# Patient Record
Sex: FEMALE | URBAN - METROPOLITAN AREA
[De-identification: names, ages, dates, MRNs, and addresses within clinical notes are randomized per-mention and may not be internally consistent; named-entity substitution may affect disease eponyms.]

---

## 2020-09-17 ENCOUNTER — APPOINTMENT (RX ONLY)
Dept: URBAN - METROPOLITAN AREA CLINIC 115 | Facility: CLINIC | Age: 25
Setting detail: DERMATOLOGY
End: 2020-09-17

## 2020-09-17 DIAGNOSIS — Z41.9 ENCOUNTER FOR PROCEDURE FOR PURPOSES OTHER THAN REMEDYING HEALTH STATE, UNSPECIFIED: ICD-10-CM

## 2020-09-17 PROCEDURE — ? BOTOX

## 2020-09-17 PROCEDURE — ? RESTYLANE DEFYNE INJECTION

## 2020-09-17 NOTE — PROCEDURE: RESTYLANE DEFYNE INJECTION
Additional Anesthesia Volume In Cc: 6
Expiration Date (Month Year): 01/31/2022
Dorsal Hands Filler  Volume In Cc: 0
Use Map Statement For Sites (Optional): No
Anesthesia Type: 1% lidocaine with epinephrine
Post-Care Instructions: Patient instructed to apply ice to reduce swelling.
Additional Area 1 Location: chin
Number Of Syringes (Required For Inventory): 1
Procedural Text: The filler was administered to the treatment areas noted above.
Additional Area 1 Volume In Cc: 0.8
Map Statement: See Attach Map for Complete Details
Anesthesia Volume In Cc: 0.5
Detail Level: Detailed
Filler: Restylane Defyne
Lot #: [id number]
Consent: Written consent obtained. Risks include but not limited to bruising, beading, irregular texture, ulceration, infection, allergic reaction, scar formation, incomplete augmentation, temporary nature, procedural pain.

## 2020-09-17 NOTE — PROCEDURE: BOTOX
Dilution (U/0.1 Cc): 2
Show Right And Left Pupillary Line Units: No
R Brow Units: 0
Show Orbicularis Oculi Units: Yes
Expiration Date (Month Year): 05/2023
Detail Level: Detailed
Consent: Written consent obtained. Risks include but not limited to lid/brow ptosis, bruising, swelling, diplopia, temporary effect, incomplete chemical denervation.
Masseter Units: 5
Post-Care Instructions: Patient instructions were given verbally and if new to Botox, in writing. Patient is to not lie down for 4 hours. They may make facial expressions over the next hour. Ice was given for any areas of obvious bruising. Schedule touch-up apt in 2 weeks if new patient. Follow up in 3-4 months for re-treatment.
Lot #: [id number]

## 2020-10-07 ENCOUNTER — APPOINTMENT (RX ONLY)
Dept: URBAN - METROPOLITAN AREA CLINIC 115 | Facility: CLINIC | Age: 25
Setting detail: DERMATOLOGY
End: 2020-10-07

## 2020-10-07 DIAGNOSIS — Z41.9 ENCOUNTER FOR PROCEDURE FOR PURPOSES OTHER THAN REMEDYING HEALTH STATE, UNSPECIFIED: ICD-10-CM

## 2020-10-07 PROCEDURE — ? ADDITIONAL NOTES

## 2020-10-07 NOTE — PROCEDURE: ADDITIONAL NOTES
Additional Notes: Overall happy with Botox and Filler \\nWill plan mentalis Botox in 4 months
Detail Level: Simple